# Patient Record
Sex: FEMALE | Race: WHITE | NOT HISPANIC OR LATINO | Employment: FULL TIME | URBAN - METROPOLITAN AREA
[De-identification: names, ages, dates, MRNs, and addresses within clinical notes are randomized per-mention and may not be internally consistent; named-entity substitution may affect disease eponyms.]

---

## 2021-01-25 ENCOUNTER — ULTRASOUND (OUTPATIENT)
Dept: OBGYN CLINIC | Age: 31
End: 2021-01-25
Payer: COMMERCIAL

## 2021-01-25 VITALS — SYSTOLIC BLOOD PRESSURE: 110 MMHG | DIASTOLIC BLOOD PRESSURE: 76 MMHG | WEIGHT: 126.8 LBS

## 2021-01-25 DIAGNOSIS — N91.2 AMENORRHEA: Primary | ICD-10-CM

## 2021-01-25 DIAGNOSIS — O02.1 MISSED ABORTION: ICD-10-CM

## 2021-01-25 PROCEDURE — 76817 TRANSVAGINAL US OBSTETRIC: CPT | Performed by: NURSE PRACTITIONER

## 2021-01-25 RX ORDER — VALACYCLOVIR HYDROCHLORIDE 1 G/1
2000 TABLET, FILM COATED ORAL
COMMUNITY
Start: 2020-11-16

## 2021-01-25 NOTE — PATIENT INSTRUCTIONS
Miscarriage   WHAT YOU NEED TO KNOW:   What is a miscarriage? A miscarriage is the loss of a fetus before 20 weeks of pregnancy  A miscarriage may also be called a spontaneous  or an early pregnancy loss  What causes a miscarriage? The cause of your miscarriage may not be known  The following may increase the risk:  · Being 28 years or older    · Genetic problems in the fetus    · Poorly controlled diabetes, high blood pressure, or thyroid disease    · An infection such as toxoplasmosis or syphilis    · Drinking alcohol or caffeine, smoking, or using drugs during pregnancy    · Being overweight or underweight    · Problems with the uterus, placenta, or cervix    What are the signs and symptoms of a miscarriage? You may not have symptoms of a miscarriage or you may have any of the following:  · Vaginal spotting or heavy bleeding    · Pain or cramping in your abdomen or lower back    · Discharge of bloody fluid, tissue, or blood clots from your vagina    · Nausea or vomiting    · Dizziness    How is a miscarriage treated? You may not need treatment for a miscarriage  You may need any of the following if you have heavy bleeding or tissue left in your uterus after the miscarriage:  · Medicine  may be given to control bleeding and prevent infection  Medicine may also be given to control pain and prevent complications in future pregnancies  · Surgery  may be needed to remove the tissue left in your uterus  Surgery may include a dilation and curettage (D&C) or a dilation and evacuation (D&E)  Surgery may also be needed to control bleeding or prevent an infection  How can I care for myself after a miscarriage? · Do not put anything in your vagina for 2 weeks or as directed  Do not use tampons, douche, or have sex  These actions can cause infection and pain  · Use sanitary pads as needed  You may have light bleeding or spotting for 2 weeks       · Do not take a bath or go swimming for 2 weeks or as directed  These actions may increase your risk for an infection  Take showers only  · Rest as needed  Slowly start to do more each day  Return to your daily activities as directed  · Talk to your healthcare provider about birth control  If you would like to prevent another pregnancy, ask your healthcare provider which type of birth control is best for you  · Join a support group or therapy to help you cope  A miscarriage may be very difficult for you, your partner, and other members of your family  There is no right way to feel after a miscarriage  You may feel overwhelming grief or other emotions  It may be helpful to talk to a friend, family member, or counselor about your feelings  You may worry that you could have another miscarriage  Talk to your healthcare provider about your concerns  He may be able to help you reduce the risk for another miscarriage  He may also help you find ways to cope with grief  Where can I find more information? · [employer]   [address] [place] [address]  Phone: [phone]  Phone: [phone]  Web Address: http://[employer]/  org    · [facility]  [facility] , [address] [place] [address]  Web Address: [employer]  When should I seek immediate care? · You have foul-smelling drainage or pus coming from your vagina  · You have heavy vaginal bleeding and soak 1 pad or more in an hour  · You have severe abdominal pain  · You feel like your heart is beating faster than normal      · You feel extremely weak or dizzy  When should I contact my healthcare provider? · You have a fever greater than 100 4°F or chills  · You have extreme sadness, grief, or feel unable to cope with what has happened  · You have questions or concerns about your condition or care  CARE AGREEMENT:   You have the right to help plan your care   Learn about your health condition and how it may be treated  Discuss treatment options with your healthcare providers to decide what care you want to receive  You always have the right to refuse treatment  The above information is an  only  It is not intended as medical advice for individual conditions or treatments  Talk to your doctor, nurse or pharmacist before following any medical regimen to see if it is safe and effective for you  © Copyright [address] Information is for End User's use only and may not be sold, redistributed or otherwise used for commercial purposes   All illustrations and images included in CareNotes® are the copyrighted property of A D A M , Inc  or [address] [place] [address]
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
## 2022-02-08 ENCOUNTER — OFFICE VISIT (OUTPATIENT)
Dept: OBGYN CLINIC | Facility: CLINIC | Age: 32
End: 2022-02-08
Payer: COMMERCIAL

## 2022-02-08 VITALS
DIASTOLIC BLOOD PRESSURE: 64 MMHG | SYSTOLIC BLOOD PRESSURE: 95 MMHG | WEIGHT: 126 LBS | HEART RATE: 84 BPM | BODY MASS INDEX: 20.99 KG/M2 | HEIGHT: 65 IN

## 2022-02-08 DIAGNOSIS — S92.352A CLOSED DISPLACED FRACTURE OF FIFTH METATARSAL BONE OF LEFT FOOT, INITIAL ENCOUNTER: Primary | ICD-10-CM

## 2022-02-08 PROCEDURE — 28470 CLTX METATARSAL FX WO MNP EA: CPT | Performed by: ORTHOPAEDIC SURGERY

## 2022-02-08 PROCEDURE — 99203 OFFICE O/P NEW LOW 30 MIN: CPT | Performed by: ORTHOPAEDIC SURGERY

## 2022-02-08 RX ORDER — LEVOTHYROXINE SODIUM 0.03 MG/1
25 TABLET ORAL DAILY
COMMUNITY
Start: 2021-04-16 | End: 2022-04-16

## 2022-02-08 NOTE — PATIENT INSTRUCTIONS
Weightbearing as tolerated in boot  Do not need to wear the boot for sleep or showering but should wear it any time you are walking on it  Recommend taking the following supplements: Vitamin D3- 4000 units per day and Calcium 1200 mg per day  This will help with bone healing  Avoid NSAIDs like Motrin/Aleve/Ibuprofen  Avoid steroids/nicotine products/ rheumatoid medications like DMARDs if possible  Aspirin 81mg BID for blood clot prevention        Knee high compression stocking 20-30mm HG of pressure

## 2022-02-08 NOTE — PROGRESS NOTES
MANOLO Avila  Attending, Orthopaedic Surgery  [facility] [address] [facility]      ORTHOPAEDIC FOOT AND ANKLE CLINIC VISIT     Assessment:     Encounter Diagnosis   Name Primary?  Closed displaced fracture of fifth metatarsal bone of left foot, initial encounter Yes          Plan:   · The patient verbalized understanding of exam findings and treatment plan  We engaged in the shared decision-making process and treatment options were discussed at length with the patient  Surgical and conservative management discussed today along with risks and benefits  · Closed 5th metatarsal shaft fracture sustained 2/4/22  · This can be managed conservatively in a postop shoe, WBAT   · Ice and elevation for swelling reduction, compression stockings   · OTC pain medications   · ASA 81mg BID for DVT ppx   Return in about 6 weeks (around 3/22/2022) for Recheck and repeat xrays left foot   Fracture / Dislocation Treatment    Date/Time: 2/8/2022 11:52 AM  Performed by: Lucie Story MD  Authorized by: Lucie Story MD     Patient Location:  Clinic  Verbal consent obtained?: Yes    Consent given by:  Patient  Patient identity confirmed:  Verbally with patient  Injury location:  Foot  Location details:  Left foot  Injury type:  Fracture  Fracture type: fifth metatarsal    Distal perfusion: normal    Neurological function: normal    Range of motion: normal    Manipulation performed?: No    Immobilization:  Other (comment) (post op shoe)  Distal perfusion: normal    Neurological function: normal    Range of motion: normal    Patient tolerance:  Patient tolerated the procedure well with no immediate complications          History of Present Illness:   Chief Complaint:   Chief Complaint   Patient presents with    Left Foot - Pain, Fracture     Chris Wood is a 33 y o  female who is being seen for left foot 5th metatarsal fracture   She twisted her foot this past Friday, 2/4/22, and sustained an injury to her left foot  She was evaluated by [facility] urgent care and referred to foot and ankle for follow up  Pain is localized at 5th metatarsal, left with minimal radiating and described as sharp and severe  Patient denies numbness, tingling or radicular pain  Denies history of neuropathy  Patient does not smoke, does not have diabetes and does not take blood thinners  Patient denies family history of anesthesia complications and has not had any complications with anesthesia  Pain/symptom timing:  Worse during the day when active  Pain/symptom context:  Worse with activites and work  Pain/symptom modifying factors:  Rest makes better, activities make worse  Pain/symptom associated signs/symptoms: none    Prior treatment   · NSAIDsNo   · Injections No   · Bracing/Orthotics Yes    · Physical Therapy No     Orthopedic Surgical History:   See below     Past Medical, Surgical and Social History:  Past Medical History:  has no past medical history on file  Problem List: does not have any pertinent problems on file  Past Surgical History:  has a past surgical history that includes  section () and Tonsillectomy ()  Family History: family history is not on file  Social History:  reports that she has quit smoking  She has never used smokeless tobacco  She reports that she does not drink alcohol and does not use drugs  Current Medications: has a current medication list which includes the following prescription(s): levothyroxine, prenatal mv-min-fe fum-fa-dha, and valacyclovir  Allergies: is allergic to penicillins       Review of Systems:  General- denies fever/chills  HEENT- denies hearing loss or sore throat  Eyes- denies eye pain or visual disturbances, denies red eyes  Respiratory- denies cough or SOB  Cardio- denies chest pain or palpitations  GI- denies abdominal pain  Endocrine- denies urinary frequency  Urinary- denies pain with urination  Musculoskeletal- Negative except noted above  Skin- denies rashes or wounds  Neurological- denies dizziness or headache  Psychiatric- denies anxiety or difficulty concentrating    Physical Exam:   BP 95/64 (BP Location: Left arm, Patient Position: Sitting, Cuff Size: Adult)   Pulse 84   Ht 5' 5" (1 651 m)   Wt 57 2 kg (126 lb)   BMI 20 97 kg/m²   General/Constitutional: No apparent distress: well-nourished and well developed  Eyes: normal ocular motion  Cardio: RRR, Normal S1S2, No m/r/g  Lymphatic: No appreciable lymphadenopathy  Respiratory: Non-labored breathing, CTA b/l no w/c/r  Vascular: No edema, swelling or tenderness, except as noted in detailed exam   Integumentary: No impressive skin lesions present, except as noted in detailed exam   Neuro: No ataxia or tremors noted  Psych: Normal mood and affect, oriented to person, place and time  Appropriate affect  Musculoskeletal: Normal, except as noted in detailed exam and in HPI  Examination    Left    Gait Antalgic   Musculoskeletal Tender to palpation at 5th metatarsal     Skin Normal       Nails Normal    Range of Motion  20 degrees dorsiflexion, 40 degrees plantarflexion  Subtalar motion: normal    Stability Stable    Muscle Strength 5/5 tibialis anterior  5/5 gastrocnemius-soleus  5/5 posterior tibialis  5/5 peroneal/eversion strength  5/5 EHL  5/5 FHL    Neurologic Normal    Sensation Intact to light touch throughout sural, saphenous, superficial peroneal, deep peroneal and medial/lateral plantar nerve distributions  Hermann-Kale 5 07 filament (10g) testing deferred  Cardiovascular Brisk capillary refill < 2 seconds,intact DP and PT pulses    Special Tests None      Imaging Studies:   3 views of the left foot were taken, reviewed and interpreted independently that demonstrate displaced fracture of 5th metatarsal shaft without significant angulation and no shortening  Reviewed by me personally  Audelia Munroe Lachman, MD  Foot & Ankle Surgery   Department of Orthopaedic Surgery  Cordell Henderson Yasmeen TO personally performed the service  Thurman Pattee Lachman, MD

## 2022-03-18 ENCOUNTER — TELEPHONE (OUTPATIENT)
Dept: OBGYN CLINIC | Facility: OTHER | Age: 32
End: 2022-03-18

## 2022-03-18 NOTE — TELEPHONE ENCOUNTER
Brianda    Patient would not be charged a copay for the visit if it is under Fracture Care but they would still be billed for an office visit    Are you calling the patient back to let her know or do we need to call her

## 2022-03-18 NOTE — TELEPHONE ENCOUNTER
I can call her but if she has any further questions I think it would be more appropriate if you guys called her so her questions are answered      Whatever is easier

## 2022-03-18 NOTE — TELEPHONE ENCOUNTER
Patient called in to see if her appointment on 03-23 with Dr Jamin Prescott is going to be billed through Fracture Care, as she no longer has the insurance she did when we saw her back on 02-08  I see the upcoming appointment as Fracture Care  She wanted to make sure insurance allowed this, and if not she will wait till her new insurance kicks in on April 1st     I believe she is fine, I just wanted to get confirmation       C/b # [phone]

## 2022-03-22 NOTE — TELEPHONE ENCOUNTER
Message was left for pt    Pt would not be charged a copay for the visit tomorrow and she can be billed as a self pay pt 50% discount but would have a 30 00 deposit due at time of the visit   An estimate is created to the visit in case pt calls to discuss further but it's just an estimate if she keeps tomorrow's visit

## 2022-03-22 NOTE — TELEPHONE ENCOUNTER
Patient called back , relayed Nata's message, she understood but wants to know why she would take the Self Pay over the Fracture care  I explained that if it is under Fracture Care she would Still be charged for an office visit,      Paulette Mata I am not sure if you can explain it any differently but thank you     C/b # [phone]

## 2022-03-23 ENCOUNTER — OFFICE VISIT (OUTPATIENT)
Dept: OBGYN CLINIC | Facility: CLINIC | Age: 32
End: 2022-03-23

## 2022-03-23 ENCOUNTER — APPOINTMENT (OUTPATIENT)
Dept: RADIOLOGY | Facility: AMBULARY SURGERY CENTER | Age: 32
End: 2022-03-23
Attending: ORTHOPAEDIC SURGERY

## 2022-03-23 VITALS
WEIGHT: 126 LBS | BODY MASS INDEX: 20.99 KG/M2 | DIASTOLIC BLOOD PRESSURE: 70 MMHG | HEART RATE: 91 BPM | HEIGHT: 65 IN | SYSTOLIC BLOOD PRESSURE: 109 MMHG

## 2022-03-23 DIAGNOSIS — S92.352A CLOSED DISPLACED FRACTURE OF FIFTH METATARSAL BONE OF LEFT FOOT, INITIAL ENCOUNTER: ICD-10-CM

## 2022-03-23 DIAGNOSIS — S92.352A CLOSED DISPLACED FRACTURE OF FIFTH METATARSAL BONE OF LEFT FOOT, INITIAL ENCOUNTER: Primary | ICD-10-CM

## 2022-03-23 PROCEDURE — 73630 X-RAY EXAM OF FOOT: CPT

## 2022-03-23 PROCEDURE — 99024 POSTOP FOLLOW-UP VISIT: CPT | Performed by: ORTHOPAEDIC SURGERY

## 2022-03-23 NOTE — PROGRESS NOTES
MANOLO Newman  Attending, Orthopaedic Surgery  [facility] [address] [facility]      ORTHOPAEDIC FOOT AND ANKLE CLINIC VISIT     Assessment:     Encounter Diagnosis   Name Primary?  Closed displaced fracture of fifth metatarsal bone of left foot, initial encounter Yes          Plan:   · The patient verbalized understanding of exam findings and treatment plan  We engaged in the shared decision-making process and treatment options were discussed at length with the patient  Surgical and conservative management discussed today along with risks and benefits  · Closed 5th metatarsal shaft fracture sustained 2/4/22  · She was advised to wean out of the post op shoe  This was given in the AVSS   · OTC pain medications   · ASA 81mg BID for DVT ppx can be discontinued  Return in about 2 months (around 5/23/2022)  and obtain weight bearing x-rays of left foot  History of Present Illness:   Chief Complaint:   Chief Complaint   Patient presents with   Mal Clarence is a 33 y o  female who is being seen for left foot 5th metatarsal fracture  She twisted her foot on 2/4/22, and sustained an injury to her left foot  She was evaluated by [facility] urgent care and referred to foot and ankle for follow up  She was advised to be weight-bearing as tolerated in a postop shoe  She states that she has minimal discomfort at this time  She will only note some pain with a twisting motion  She was advised to use ice, elevation and OTC pain medications  Patient denies numbness, tingling or radicular pain  Denies history of neuropathy  Patient does not smoke, does not have diabetes  Patient denies family history of anesthesia complications and has not had any complications with anesthesia       Pain/symptom timing:  Worse during the day when active  Pain/symptom context:  Worse with activites and work  Pain/symptom modifying factors:  Rest makes better, activities make worse  Pain/symptom associated signs/symptoms: none    Prior treatment   · NSAIDsNo   · Injections No   · Bracing/Orthotics Yes    · Physical Therapy No     Orthopedic Surgical History:   See below     Past Medical, Surgical and Social History:  Past Medical History:  has no past medical history on file  Problem List: does not have any pertinent problems on file  Past Surgical History:  has a past surgical history that includes  section (2017) and Tonsillectomy ()  Family History: family history is not on file  Social History:  reports that she has quit smoking  She has never used smokeless tobacco  She reports that she does not drink alcohol and does not use drugs  Current Medications: has a current medication list which includes the following prescription(s): cholecalciferol, levothyroxine, prenatal mv-min-fe fum-fa-dha, and valacyclovir  Allergies: is allergic to penicillins  Review of Systems:  General- denies fever/chills  HEENT- denies hearing loss or sore throat  Eyes- denies eye pain or visual disturbances, denies red eyes  Respiratory- denies cough or SOB  Cardio- denies chest pain or palpitations  GI- denies abdominal pain  Endocrine- denies urinary frequency  Urinary- denies pain with urination  Musculoskeletal- Negative except noted above  Skin- denies rashes or wounds  Neurological- denies dizziness or headache  Psychiatric- denies anxiety or difficulty concentrating    Physical Exam:   /70 (BP Location: Right arm, Patient Position: Sitting, Cuff Size: Adult)   Pulse 91   Ht 5' 5" (1 651 m)   Wt 57 2 kg (126 lb)   BMI 20 97 kg/m²   General/Constitutional: No apparent distress: well-nourished and well developed    Eyes: normal ocular motion  Cardio: RRR, Normal S1S2, No m/r/g  Lymphatic: No appreciable lymphadenopathy  Respiratory: Non-labored breathing, CTA b/l no w/c/r  Vascular: No edema, swelling or tenderness, except as noted in detailed exam   Integumentary: No impressive skin lesions present, except as noted in detailed exam   Neuro: No ataxia or tremors noted  Psych: Normal mood and affect, oriented to person, place and time  Appropriate affect  Musculoskeletal: Normal, except as noted in detailed exam and in HPI  Examination    Left    Gait Antalgic   Musculoskeletal Tender to palpation at 5th metatarsal     Skin Normal       Nails Normal    Range of Motion  20 degrees dorsiflexion, 40 degrees plantarflexion  Subtalar motion: normal    Stability Stable    Muscle Strength 5/5 tibialis anterior  5/5 gastrocnemius-soleus  5/5 posterior tibialis  5/5 peroneal/eversion strength  5/5 EHL  5/5 FHL    Neurologic Normal    Sensation Intact to light touch throughout sural, saphenous, superficial peroneal, deep peroneal and medial/lateral plantar nerve distributions  Glenwood-Kale 5 07 filament (10g) testing deferred  Cardiovascular Brisk capillary refill < 2 seconds,intact DP and PT pulses    Special Tests None      Imaging Studies:   3 views of the left foot were taken, reviewed and interpreted independently that demonstrate displaced fracture of 5th metatarsal shaft without significant angulation and no shortening evidence of callus formation  Reviewed by me personally  Isiah Hoyer Lachman, MD  Foot & Ankle Surgery   Department of [address] [place] [address]      I personally performed the service  Isiah Hoyer Lachman, MD

## 2022-03-23 NOTE — PATIENT INSTRUCTIONS
You may now begin weaning your boot and transitioning to a sneaker  It is important to do this gradually to avoid aggravating the healing process  1  Today, you may come out of the boot into a sneaker for 1 hours  2  Tomorrow, you may come out of the boot into a sneaker for 2 hours,  3  The next day, you may come out of the boot into a sneaker for 3 hours  4  Continue this (adding 1 hours per day) as you tolerate  For example, if you do 6 hours out of the boot into a sneaker and your foot swells more than usual at night and it is difficult to control the discomfort, do not advance to 7 hours the next day, stay at 6 hours until you are able to tolerate it  Elevation, Ice and tylenol and staying off of it at night will be important to aide in this transition out of the boot  Swelling and soreness are normal as you begin to do more with the injured leg

## 2024-07-04 ENCOUNTER — OFFICE VISIT (OUTPATIENT)
Dept: URGENT CARE | Facility: CLINIC | Age: 34
End: 2024-07-04
Payer: COMMERCIAL

## 2024-07-04 ENCOUNTER — APPOINTMENT (OUTPATIENT)
Dept: URGENT CARE | Facility: CLINIC | Age: 34
End: 2024-07-04
Payer: COMMERCIAL

## 2024-07-04 VITALS
OXYGEN SATURATION: 98 % | HEART RATE: 75 BPM | RESPIRATION RATE: 18 BRPM | TEMPERATURE: 98.3 F | DIASTOLIC BLOOD PRESSURE: 76 MMHG | SYSTOLIC BLOOD PRESSURE: 110 MMHG

## 2024-07-04 DIAGNOSIS — L03.317 CELLULITIS, GLUTEAL, LEFT: Primary | ICD-10-CM

## 2024-07-04 PROCEDURE — S9083 URGENT CARE CENTER GLOBAL: HCPCS | Performed by: PHYSICIAN ASSISTANT

## 2024-07-04 PROCEDURE — G0382 LEV 3 HOSP TYPE B ED VISIT: HCPCS | Performed by: PHYSICIAN ASSISTANT

## 2024-07-04 RX ORDER — SPIRONOLACTONE 25 MG/1
25 TABLET ORAL DAILY
COMMUNITY
Start: 2024-06-23

## 2024-07-04 RX ORDER — HYDROXYZINE HYDROCHLORIDE 10 MG/1
1 TABLET, FILM COATED ORAL EVERY 6 HOURS PRN
COMMUNITY
Start: 2024-06-24

## 2024-07-04 RX ORDER — SULFAMETHOXAZOLE AND TRIMETHOPRIM 800; 160 MG/1; MG/1
1 TABLET ORAL EVERY 12 HOURS SCHEDULED
Qty: 14 TABLET | Refills: 0 | Status: SHIPPED | OUTPATIENT
Start: 2024-07-04 | End: 2024-07-11

## 2024-07-04 RX ORDER — ESCITALOPRAM OXALATE 10 MG/1
1.5 TABLET ORAL DAILY
COMMUNITY
Start: 2024-06-23

## 2024-07-04 NOTE — PROGRESS NOTES
[facility]        NAME: Elizabet Gomes is a 33 y.o. female  : 1990    MRN: [id number]  DATE: 2024  TIME: 12:00 PM    Assessment and Plan   Cellulitis, gluteal, left [L03.317]  1. Cellulitis, gluteal, left  sulfamethoxazole-trimethoprim (BACTRIM DS) 800-160 mg per tablet          No evidence of abscess or a subcutaneous fluid collection on exam, will treat for localized cellulitis advised her to follow-up in the ED if any worsening PCP if no improvement    The patient verbalized understanding of exam findings and treatment plan.   We engaged in the shared decision-making process and treatment options were   discussed at length with the patient.  All questions, concerns and  complaints were answered and addressed to the patient's satisfaction.    Patient Instructions   There are no Patient Instructions on file for this visit.    Follow up with PCP in 3-5 days.  Proceed to  ER if symptoms worsen.    If tests are performed, our office will contact you with results only if   changes need to made to the care plan discussed with you at the visit.   [facility]     Chief Complaint     Chief Complaint   Patient presents with   • Infection     Pt c/o infected pimple on bottom that popped on Monday and is now in a lot of pain         History of Present Illness       HPI  Pt reports pimple in the left gluteal fold at the junction of the upper thigh was popped on Monday. Wasn't covered or treated. Has been noticing redness, warmth. Did warm compresses, ibuprofen with some relief. Hurts to sit on it as well as walk. No fever or chills.     Review of Systems   Review of Systems  All other related systems reviewed and are negative except as noted in HPI    Current Medications       Current Outpatient Medications:   •  Cholecalciferol 25 MCG (1000 UT) tablet, Take 1,000 Units by mouth daily, Disp: , Rfl:   •  escitalopram (LEXAPRO) 10 mg tablet, 1.5 tablets daily,  Disp: , Rfl:   •  hydrOXYzine HCL (ATARAX) 10 mg tablet, Take 1 tablet by mouth every 6 (six) hours as needed, Disp: , Rfl:   •  spironolactone (ALDACTONE) 25 mg tablet, Take 25 mg by mouth daily, Disp: , Rfl:   •  sulfamethoxazole-trimethoprim (BACTRIM DS) 800-160 mg per tablet, Take 1 tablet by mouth every 12 (twelve) hours for 7 days, Disp: 14 tablet, Rfl: 0  •  valACYclovir (VALTREX) 1,000 mg tablet, Take 2,000 mg by mouth, Disp: , Rfl:   •  levothyroxine 25 mcg tablet, Take 25 mcg by mouth daily, Disp: , Rfl:   •  Prenatal MV-Min-Fe Fum-FA-DHA (PRENATAL 1 PO), Take 1 tablet by mouth daily, Disp: , Rfl:     Current Allergies     Allergies as of 2024 - Reviewed 2024   Allergen Reaction Noted   • Penicillins Rash 2021            The following portions of the patient's history were reviewed and updated as appropriate: allergies, current medications, past family history, past medical history, past social history, past surgical history and problem list.     History reviewed. No pertinent past medical history.    Past Surgical History:   Procedure Laterality Date   •  SECTION     • TONSILLECTOMY         History reviewed. No pertinent family history.      Medications have been verified.        Objective   /76   Pulse 75   Temp 98.3 °F (36.8 °C) (Tympanic)   Resp 18   SpO2 98%   No LMP recorded.       Physical Exam     Physical Exam  Constitutional:       General: She is not in acute distress.     Appearance: She is well-developed.   HENT:      Head: Normocephalic and atraumatic.   Eyes:      General: No scleral icterus.     Conjunctiva/sclera: Conjunctivae normal.   Neck:      Trachea: No tracheal deviation.   Pulmonary:      Effort: Pulmonary effort is normal. No respiratory distress.      Breath sounds: No stridor.   Musculoskeletal:      Cervical back: Normal range of motion.   Skin:     General: Skin is warm and dry.      Comments: Nata Mustafa present for entirety of exam.  " Patient exposed the left inferior gluteal fold there is a small healing papule with no expressible purulence there is no palpable subcutaneous fluid collection there was erythema spreading more than 1 cm from around the papule consistent with cellulitis   Neurological:      Mental Status: She is alert and oriented to person, place, and time.   Psychiatric:         Behavior: Behavior normal.         Ortho Exam        Procedures  No Procedures performed today        Note: Portions of this record may have been created with voice recognition software. Occasional wrong word or \"sound a like\" substitutions may have occurred due to the inherent limitations of voice recognition software. Please read the chart carefully and recognize, using context, where substitutions have occurred.*      "